# Patient Record
Sex: FEMALE | Race: WHITE | NOT HISPANIC OR LATINO | Employment: OTHER | URBAN - METROPOLITAN AREA
[De-identification: names, ages, dates, MRNs, and addresses within clinical notes are randomized per-mention and may not be internally consistent; named-entity substitution may affect disease eponyms.]

---

## 2017-10-03 ENCOUNTER — TRANSCRIBE ORDERS (OUTPATIENT)
Dept: ADMINISTRATIVE | Facility: HOSPITAL | Age: 77
End: 2017-10-03

## 2017-10-03 DIAGNOSIS — Z12.31 SCREENING MAMMOGRAM, ENCOUNTER FOR: Primary | ICD-10-CM

## 2017-11-06 ENCOUNTER — HOSPITAL ENCOUNTER (OUTPATIENT)
Dept: MAMMOGRAPHY | Facility: HOSPITAL | Age: 77
Discharge: HOME | End: 2017-11-06
Attending: OBSTETRICS & GYNECOLOGY | Admitting: OBSTETRICS & GYNECOLOGY

## 2017-11-06 DIAGNOSIS — Z12.31 SCREENING MAMMOGRAM, ENCOUNTER FOR: ICD-10-CM

## 2017-11-06 PROCEDURE — G0202 SCR MAMMO BI INCL CAD: HCPCS

## 2019-06-10 ENCOUNTER — TRANSCRIBE ORDERS (OUTPATIENT)
Dept: ADMINISTRATIVE | Facility: HOSPITAL | Age: 79
End: 2019-06-10

## 2019-06-10 DIAGNOSIS — Z12.31 VISIT FOR SCREENING MAMMOGRAM: Primary | ICD-10-CM

## 2019-06-11 ENCOUNTER — HOSPITAL ENCOUNTER (OUTPATIENT)
Dept: MAMMOGRAPHY | Facility: HOSPITAL | Age: 79
Discharge: HOME | End: 2019-06-11
Admitting: INTERNAL MEDICINE

## 2019-06-11 DIAGNOSIS — Z12.31 VISIT FOR SCREENING MAMMOGRAM: ICD-10-CM

## 2019-06-11 PROCEDURE — 77067 SCR MAMMO BI INCL CAD: CPT

## 2019-06-12 ENCOUNTER — TRANSCRIBE ORDERS (OUTPATIENT)
Dept: ADMINISTRATIVE | Facility: HOSPITAL | Age: 79
End: 2019-06-12

## 2019-06-12 DIAGNOSIS — R92.8 ABNORMAL MAMMOGRAM: Primary | ICD-10-CM

## 2019-06-13 ENCOUNTER — HOSPITAL ENCOUNTER (OUTPATIENT)
Dept: ULTRASOUND IMAGING | Facility: HOSPITAL | Age: 79
Discharge: HOME | End: 2019-06-13

## 2019-06-13 ENCOUNTER — HOSPITAL ENCOUNTER (OUTPATIENT)
Dept: MAMMOGRAPHY | Facility: HOSPITAL | Age: 79
Discharge: HOME | End: 2019-06-13
Admitting: INTERNAL MEDICINE

## 2019-06-13 DIAGNOSIS — R92.8 ABNORMAL MAMMOGRAM: ICD-10-CM

## 2019-06-13 PROCEDURE — 77065 DX MAMMO INCL CAD UNI: CPT

## 2019-06-13 PROCEDURE — 76642 ULTRASOUND BREAST LIMITED: CPT

## 2019-06-14 ENCOUNTER — TRANSCRIBE ORDERS (OUTPATIENT)
Dept: ADMINISTRATIVE | Facility: HOSPITAL | Age: 79
End: 2019-06-14

## 2019-06-21 ENCOUNTER — HOSPITAL ENCOUNTER (OUTPATIENT)
Dept: ULTRASOUND IMAGING | Facility: HOSPITAL | Age: 79
Discharge: HOME | End: 2019-06-21
Admitting: INTERNAL MEDICINE

## 2019-06-21 VITALS
TEMPERATURE: 97.3 F | WEIGHT: 138.89 LBS | HEART RATE: 68 BPM | BODY MASS INDEX: 27.27 KG/M2 | HEIGHT: 60 IN | OXYGEN SATURATION: 98 % | DIASTOLIC BLOOD PRESSURE: 84 MMHG | RESPIRATION RATE: 16 BRPM | SYSTOLIC BLOOD PRESSURE: 148 MMHG

## 2019-06-21 DIAGNOSIS — N64.4 PAIN OF RIGHT BREAST: ICD-10-CM

## 2019-06-21 PROCEDURE — A4648 IMPLANTABLE TISSUE MARKER: HCPCS

## 2019-06-21 PROCEDURE — 88307 TISSUE EXAM BY PATHOLOGIST: CPT | Performed by: RADIOLOGY

## 2019-06-21 RX ORDER — ENALAPRIL MALEATE AND HYDROCHLOROTHIAZIDE 10; 25 MG/1; MG/1
TABLET ORAL
COMMUNITY

## 2019-06-21 RX ORDER — IBANDRONATE SODIUM 150 MG/1
TABLET, FILM COATED ORAL
COMMUNITY

## 2019-06-21 RX ORDER — OMEGA-3S/DHA/EPA/FISH OIL/D3 300MG-1000
400 CAPSULE ORAL DAILY
COMMUNITY

## 2019-06-21 RX ORDER — LIDOCAINE HYDROCHLORIDE 10 MG/ML
20 INJECTION, SOLUTION INFILTRATION; PERINEURAL ONCE
Status: COMPLETED | OUTPATIENT
Start: 2019-06-21 | End: 2019-06-21

## 2019-06-21 RX ADMIN — LIDOCAINE HYDROCHLORIDE 20 ML: 10 INJECTION, SOLUTION INFILTRATION; PERINEURAL at 14:54

## 2019-06-22 ENCOUNTER — TELEPHONE (OUTPATIENT)
Dept: INTERVENTIONAL RADIOLOGY/VASCULAR | Facility: HOSPITAL | Age: 79
End: 2019-06-22

## 2019-06-22 LAB
CYTO UR: NORMAL
LAB AP CASE REPORT: NORMAL
LAB AP CLINICAL INFORMATION: NORMAL
PATH REPORT.FINAL DX SPEC: NORMAL
PATH REPORT.GROSS SPEC: NORMAL

## 2019-06-22 NOTE — TELEPHONE ENCOUNTER
Spoke with patient's daughter who states patient is doing fine.  Is using her ice pack and has no problems or concerns.

## 2019-11-26 ENCOUNTER — TRANSCRIBE ORDERS (OUTPATIENT)
Dept: ADMINISTRATIVE | Facility: HOSPITAL | Age: 79
End: 2019-11-26

## 2019-11-26 DIAGNOSIS — R31.9 HEMATURIA, UNSPECIFIED TYPE: Primary | ICD-10-CM

## 2019-11-29 ENCOUNTER — HOSPITAL ENCOUNTER (OUTPATIENT)
Dept: CT IMAGING | Facility: HOSPITAL | Age: 79
Discharge: HOME | End: 2019-11-29
Admitting: OBSTETRICS & GYNECOLOGY

## 2019-11-29 DIAGNOSIS — R31.9 HEMATURIA, UNSPECIFIED TYPE: ICD-10-CM

## 2019-11-29 PROCEDURE — 25010000002 IOPAMIDOL 61 % SOLUTION: Performed by: OBSTETRICS & GYNECOLOGY

## 2019-11-29 PROCEDURE — 74178 CT ABD&PLV WO CNTR FLWD CNTR: CPT

## 2019-11-29 PROCEDURE — 82565 ASSAY OF CREATININE: CPT

## 2019-11-29 RX ADMIN — IOPAMIDOL 85 ML: 612 INJECTION, SOLUTION INTRAVENOUS at 11:02

## 2019-12-03 LAB — CREAT BLDA-MCNC: 0.7 MG/DL (ref 0.6–1.3)

## 2024-03-13 ENCOUNTER — OFFICE VISIT (OUTPATIENT)
Dept: ORTHOPEDIC SURGERY | Facility: CLINIC | Age: 84
End: 2024-03-13
Payer: MEDICARE

## 2024-03-13 VITALS — BODY MASS INDEX: 28.09 KG/M2 | HEIGHT: 61 IN | WEIGHT: 148.8 LBS | TEMPERATURE: 96.9 F

## 2024-03-13 DIAGNOSIS — S22.080A T12 COMPRESSION FRACTURE, INITIAL ENCOUNTER: Primary | ICD-10-CM

## 2024-03-13 RX ORDER — AMLODIPINE BESYLATE 5 MG/1
5 TABLET ORAL DAILY
COMMUNITY

## 2024-03-13 RX ORDER — CYCLOBENZAPRINE HCL 10 MG
TABLET ORAL
COMMUNITY
Start: 2024-03-07

## 2024-03-13 RX ORDER — IBUPROFEN 400 MG/1
400 TABLET ORAL EVERY 6 HOURS PRN
COMMUNITY

## 2024-03-13 NOTE — PROGRESS NOTES
Patient Name: Marvel Fish   YOB: 1940  Referring Primary Care Physician: Miguel Hightower MD      Chief Complaint:    Chief Complaint   Patient presents with    Lumbar Spine - Pain, Initial Evaluation        Previous Treatment:     Fall about 1 week ago - T12 Fx    Oral steroids       Back Pain  This is a new problem. The current episode started in the past 7 days. The problem occurs constantly. The problem has been worse since onset. Pain location: Rt .lower paraspinal. The quality of the pain is described as stabbing (Deep, throbbing). The pain is at a severity of 7/10. The pain is moderate. The pain is Worse during the night. The symptoms are aggravated by twisting and bending (Going from sitting to standing). Pertinent negatives include no bladder incontinence, bowel incontinence, leg pain, numbness, tingling or weakness. Risk factors include menopause and history of osteoporosis. She has tried muscle relaxant, NSAIDs and walking (Oral steroids) for the symptoms. The treatment provided mild relief.        HPI:  Marvel Fish is a 83 y.o. female who presents to [facility] for evaluation of above complaints.  Apparently she fell about 2 weeks ago.  Her daughter who accompanies her today and helps with translation states she was unaware of the fall for about a week and is uncertain exactly how it happened.  Most likely, she says her mother fell while getting up to the bathroom in the middle the night and may have tripped over an exercise device.  She has some bruising to the left lateral hip.  She did follow-up with her PCP and had x-rays at [facility].  Apparently there was indication that there were no compression deformities, however a friend of the family, Dr. Christopher Sunshine, reached out to the office today with concern for T12 compression deformity on the same films.  Patient denies any bowel or bladder dysfunction, saddle anesthesia or lower extremity  pain, weakness or numbness.  This is a new patient to practice, new to me.  Prior pertinent records were reviewed.  PFSH:  See attached    ROS: As per HPI, otherwise negative    Objective:      83 y.o. female  Body mass index is 28.12 kg/m²., 67.5 kg (148 lb 12.8 oz), @HT@  Vitals:    03/13/24 1433   Temp: 96.9 °F (36.1 °C)     Pain Score    03/13/24 1433   PainSc:   7   PainLoc: Back            Spine Musculoskeletal Exam    Gait    Gait is normal.    Inspection    Coronal balance: no imbalance    Sagittal balance: no imbalance    Palpation    Thoracolumbar    Tenderness: present      Paraspinous: right      SI Joint: right      Spinous process comment: Very mild tenderness upon deep palpation of the lower thoracic spine    Strength    Thoracolumbar    Thoracolumbar motor exam is normal.       Sensory    Thoracolumbar    Thoracolumbar sensation is normal.    General      Constitutional: well-developed and well-nourished    Scleral icterus: no    Labored breathing: no    Psychiatric: normal mood and affect and no acute distress    Neurological: alert and oriented x3    Skin: intact        IMAGING:       No imaging in office today.  Thoracic films at [facility] 03/06/2024 revealed very mild compression deformity of the anterior superior endplate of T12 and otherwise exaggerated kyphosis in the upper thoracic spine, anterior osteophytes at T8-9 and T9-10    Assessment:           Diagnoses and all orders for this visit:    1. T12 compression fracture, initial encounter (Primary)             Plan:  There are no red flags on exam to raise concern for retropulsion. We discussed such red flags although low suspicion any will occur. Patient was educated on the typical prognosis for compression fractures like this and that most patients heal on their own over the course of 6 to 12 weeks from the initial injury.  With reported history of osteoporosis, will likely be closer to the 12-week gertrudis, but should not have exquisite pain  for that period of time. If pain persists beyond 8 weeks from the initial injury or starts to worsen, may need MRI.  Will plan on seeing her back in 4 weeks to see how she is progressing and repeat AP and lateral films for comparison. Otherwise we can treat symptoms conservatively with OTC analgesics or lidocaine patches as needed.  May also use ice or heat 10-20 minutes 3-4 times daily for pain relief.  She was fitted emergently for a TLSO brace which she has indicated for support and stability of the T12 compression deformity and hopefully reduce further compression and risk of thoracolumbar kyphosis.      Return in about 4 weeks (around 4/10/2024) for With repeat films.    EMR Dragon/Transcription Disclaimer:   Much of this encounter note is an electronic transcription/translation of spoken language to printed text. The electronic translation of spoken language may permit erroneous, or at times, nonsensical words or phrases to be inadvertently transcribed; Although I have reviewed the note for such errors, some may still exist.  Red flags have been discussed at this or previous visits to include but not limited weakness in extremities, worsening pain that does not respond to conservative treatment and bowel or bladder dysfunction. These are reasons to present to ER and patient has been informed.    The diagnosis(es), natural history, pathophysiology and treatment for diagnosis(es) were discussed. Opportunity given and questions answered. Biomechanics of pertinent body areas discussed.    EXERCISES:  Advice on benefits of, and types of regular/moderate exercise pertaining to diagnosis.  Continue HEP. For back or neck pain, recommend pilates and or yoga as tolerated. Generally it is best to start any new exercise under the guidance of a  or therapist.   MEDICATIONS:  When prescribe, the risks, benefits, warnings,side effects and alternatives of medications discussed. Advised against long term use of  narcotics.   PAIN CONTROL:  Cold, heat, OTC lidocaine patches and/or ointment as needed. Avoid direct skin contact with ice. Ice 15-20 minutes 3-4 times daily as needed. For SI joint pain, recommend ice bath in water about 50 degrees for 5 consecutive days, add ice slowly to help with adjustment and may cover with warm towel or robe to help with cold tolerance. If using lidocaine, do not apply heat in conjunction as this can cause a burn.   MEDICAL RECORDS reviewed from other provider(s) for past and current medical history pertinent to this visit..

## 2024-03-13 NOTE — ADDENDUM NOTE
Addended by: GERALDO VALERA on: 3/13/2024 03:26 PM     Modules accepted: Orders    
Addended by: GERALDO VALERA on: 3/13/2024 04:28 PM     Modules accepted: Orders    
Statement Selected

## 2024-04-10 ENCOUNTER — OFFICE VISIT (OUTPATIENT)
Dept: ORTHOPEDIC SURGERY | Facility: CLINIC | Age: 84
End: 2024-04-10
Payer: MEDICARE

## 2024-04-10 VITALS — TEMPERATURE: 97.7 F | WEIGHT: 148.4 LBS | BODY MASS INDEX: 28.02 KG/M2 | HEIGHT: 61 IN

## 2024-04-10 DIAGNOSIS — R52 PAIN: Primary | ICD-10-CM

## 2024-04-10 DIAGNOSIS — S22.080D: ICD-10-CM

## 2024-04-10 NOTE — PROGRESS NOTES
Patient Name: Marvel Fish   YOB: 1940  Referring Primary Care Physician: Miguel Hightower MD      Chief Complaint:    Chief Complaint   Patient presents with    Lumbar Spine - Follow-up, Pain            HPI:  Marvel Fish is a 84 y.o. female who presents to [facility] for follow-up of T12 lumbar vertebral fracture.  She has been utilizing TLSO brace since last visit.  She does report the pain is much improved now getting some pain in the flank area likely related to positioning of the brace.  Still has any bowel or bladder dysfunction, saddle anesthesia or lower extremity radiculopathy.    PFSH:  See attached    ROS: As per HPI, otherwise negative    Objective:      84 y.o. female  Body mass index is 28.05 kg/m²., 67.3 kg (148 lb 6.4 oz), @HT@  Vitals:    04/10/24 1012   Temp: 97.7 °F (36.5 °C)     Pain Score    04/10/24 1012   PainSc: 0-No pain   PainLoc: Back            Spine Musculoskeletal Exam    Gait    Gait is normal.    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  Minimal grade 1 anterolisthesis L4 and L5 and L5 and S1        Assessment:           Diagnoses and all orders for this visit:    1. Pain (Primary)  -     XR Spine Lumbar 2 or 3 View    2. Traumatic compression fracture of T12 thoracic vertebra, with routine healing, subsequent encounter             Plan:  She  is progressing as expected with minimal residual back pain and really limited deep palpation of the lower thoracic, upper lumbar spine.  She has been utilizing the TLSO brace but now seems to be aggravating the flank area where the brace is probably rubbing.  Will have her wean out of the brace now.  She can continue to wear it for increased activity days such as yard work.  Still advised avoid any bending twisting especially with any heavy objects and to avoid lifting greater than 20 to 30 pounds lifelong.  She is also starting to get more aggressive with a walking program which is excellent for her back and bone health.  She also takes a multivitamin and vitamin D3.  Her daughter states that she used to be treated for osteo porosis but now only wants to rely on the supplements.  Discussed red flags.  She will follow-up as needed in the future, call with any sudden increase in back pain or present to the emergency room.  Her daughter helps with translation today although patient does seem to understand English fairly well.    Return if symptoms worsen or fail to improve.    EMR Dragon/Transcription Disclaimer:   Much of this encounter note is an electronic transcription/translation of spoken language to printed text. The electronic translation of spoken language may permit erroneous, or at times, nonsensical words or phrases to be inadvertently transcribed; Although I have reviewed the note for such errors, some may still exist.  Red flags have been discussed at this or previous visits to include but not limited weakness in extremities, worsening pain that does not respond to conservative treatment and bowel or bladder dysfunction. These are reasons to present to ER and patient has been informed.    The diagnosis(es), natural history, pathophysiology and treatment for diagnosis(es) were discussed. Opportunity given and questions answered. Biomechanics of pertinent body areas discussed.    EXERCISES:  Advice on  benefits of, and types of regular/moderate exercise pertaining to diagnosis.  Continue HEP. For back or neck pain, recommend pilates and or yoga as tolerated. Generally it is best to start any new exercise under the guidance of a  or therapist.   MEDICATIONS:  When prescribe, the risks, benefits, warnings,side effects and alternatives of medications discussed. Advised against long term use of narcotics.   PAIN CONTROL:  Cold, heat, OTC lidocaine patches and/or ointment as needed. Avoid direct skin contact with ice. Ice 15-20 minutes 3-4 times daily as needed. For SI joint pain, recommend ice bath in water about 50 degrees for 5 consecutive days, add ice slowly to help with adjustment and may cover with warm towel or robe to help with cold tolerance. If using lidocaine, do not apply heat in conjunction as this can cause a burn.   MEDICAL RECORDS reviewed from other provider(s) for past and current medical history pertinent to this visit..

## 2024-09-20 ENCOUNTER — TRANSCRIBE ORDERS (OUTPATIENT)
Dept: ADMINISTRATIVE | Facility: HOSPITAL | Age: 84
End: 2024-09-20
Payer: MEDICARE

## 2024-09-20 DIAGNOSIS — Z12.31 SCREENING MAMMOGRAM FOR HIGH-RISK PATIENT: Primary | ICD-10-CM
